# Patient Record
Sex: MALE | Race: WHITE
[De-identification: names, ages, dates, MRNs, and addresses within clinical notes are randomized per-mention and may not be internally consistent; named-entity substitution may affect disease eponyms.]

---

## 2022-12-02 ENCOUNTER — HOSPITAL ENCOUNTER (OUTPATIENT)
Dept: HOSPITAL 47 - LABPAT | Age: 57
Discharge: HOME | End: 2022-12-02
Attending: ORTHOPAEDIC SURGERY
Payer: COMMERCIAL

## 2022-12-02 DIAGNOSIS — Z01.812: Primary | ICD-10-CM

## 2022-12-02 LAB
ALBUMIN SERPL-MCNC: 4.9 G/DL (ref 3.8–4.9)
ALBUMIN/GLOB SERPL: 1.91 G/DL (ref 1.6–3.17)
ALP SERPL-CCNC: 89 U/L (ref 41–126)
ALT SERPL-CCNC: 39 U/L (ref 10–49)
ANION GAP SERPL CALC-SCNC: 12.7 MMOL/L (ref 10–18)
APTT BLD: 26 SEC (ref 22–30)
AST SERPL-CCNC: 22 U/L (ref 14–35)
BASOPHILS # BLD AUTO: 0.05 X 10*3/UL (ref 0–0.1)
BASOPHILS NFR BLD AUTO: 0.6 %
BUN SERPL-SCNC: 15.8 MG/DL (ref 9–27)
BUN/CREAT SERPL: 14.77 RATIO (ref 12–20)
CALCIUM SPEC-MCNC: 10 MG/DL (ref 8.7–10.3)
CHLORIDE SERPL-SCNC: 101 MMOL/L (ref 96–109)
CO2 SERPL-SCNC: 26.4 MMOL/L (ref 20–27.5)
EOSINOPHIL # BLD AUTO: 0.54 X 10*3/UL (ref 0.04–0.35)
EOSINOPHIL NFR BLD AUTO: 6.6 %
ERYTHROCYTE [DISTWIDTH] IN BLOOD BY AUTOMATED COUNT: 5.59 X 10*6/UL (ref 4.4–5.6)
ERYTHROCYTE [DISTWIDTH] IN BLOOD: 13.3 % (ref 11.5–14.5)
GLOBULIN SER CALC-MCNC: 2.6 G/DL (ref 1.6–3.3)
GLUCOSE SERPL-MCNC: 105 MG/DL (ref 70–110)
HCT VFR BLD AUTO: 52.3 % (ref 39.6–50)
HGB BLD-MCNC: 17.3 G/DL (ref 13–17)
IMM GRANULOCYTES BLD QL AUTO: 0.2 %
INR PPP: 1.1 (ref ?–1.2)
LYMPHOCYTES # SPEC AUTO: 1.42 X 10*3/UL (ref 0.9–5)
LYMPHOCYTES NFR SPEC AUTO: 17.4 %
MCH RBC QN AUTO: 30.9 PG (ref 27–32)
MCHC RBC AUTO-ENTMCNC: 33.1 G/DL (ref 32–37)
MCV RBC AUTO: 93.6 FL (ref 80–97)
MONOCYTES # BLD AUTO: 0.58 X 10*3/UL (ref 0.2–1)
MONOCYTES NFR BLD AUTO: 7.1 %
NEUTROPHILS # BLD AUTO: 5.54 X 10*3/UL (ref 1.8–7.7)
NEUTROPHILS NFR BLD AUTO: 68.1 %
NRBC BLD AUTO-RTO: 0 /100 WBCS (ref 0–0)
PH UR: 5.5 [PH] (ref 5–8)
PLATELET # BLD AUTO: 225 X 10*3/UL (ref 140–440)
POTASSIUM SERPL-SCNC: 4.5 MMOL/L (ref 3.5–5.5)
PROT SERPL-MCNC: 7.5 G/DL (ref 6.2–8.2)
PROT UR QL: (no result)
PT BLD: 11.7 SEC (ref 9–12)
RBC UR QL: 2 /HPF (ref 0–5)
SODIUM SERPL-SCNC: 140 MMOL/L (ref 135–145)
SP GR UR: 1.03 (ref 1–1.03)
UROBILINOGEN UR QL STRIP: <2 MG/DL (ref ?–2)
WBC # BLD AUTO: 8.15 X 10*3/UL (ref 4.5–10)
WBC #/AREA URNS HPF: 2 /HPF (ref 0–5)

## 2022-12-02 PROCEDURE — 85025 COMPLETE CBC W/AUTO DIFF WBC: CPT

## 2022-12-02 PROCEDURE — 80053 COMPREHEN METABOLIC PANEL: CPT

## 2022-12-02 PROCEDURE — 85610 PROTHROMBIN TIME: CPT

## 2022-12-02 PROCEDURE — 87070 CULTURE OTHR SPECIMN AEROBIC: CPT

## 2022-12-02 PROCEDURE — 81001 URINALYSIS AUTO W/SCOPE: CPT

## 2022-12-02 PROCEDURE — 85730 THROMBOPLASTIN TIME PARTIAL: CPT

## 2022-12-12 ENCOUNTER — HOSPITAL ENCOUNTER (OUTPATIENT)
Dept: HOSPITAL 47 - OR | Age: 57
Discharge: HOME HEALTH | End: 2022-12-12
Attending: ORTHOPAEDIC SURGERY
Payer: COMMERCIAL

## 2022-12-12 VITALS — TEMPERATURE: 97.2 F

## 2022-12-12 VITALS — HEART RATE: 79 BPM

## 2022-12-12 VITALS — SYSTOLIC BLOOD PRESSURE: 108 MMHG | RESPIRATION RATE: 18 BRPM | DIASTOLIC BLOOD PRESSURE: 62 MMHG

## 2022-12-12 VITALS — BODY MASS INDEX: 34.9 KG/M2

## 2022-12-12 DIAGNOSIS — Z79.899: ICD-10-CM

## 2022-12-12 DIAGNOSIS — I10: ICD-10-CM

## 2022-12-12 DIAGNOSIS — Z79.84: ICD-10-CM

## 2022-12-12 DIAGNOSIS — Z87.891: ICD-10-CM

## 2022-12-12 DIAGNOSIS — M25.752: ICD-10-CM

## 2022-12-12 DIAGNOSIS — M16.12: Primary | ICD-10-CM

## 2022-12-12 DIAGNOSIS — Z86.59: ICD-10-CM

## 2022-12-12 DIAGNOSIS — Z82.49: ICD-10-CM

## 2022-12-12 DIAGNOSIS — Z79.83: ICD-10-CM

## 2022-12-12 DIAGNOSIS — Z79.02: ICD-10-CM

## 2022-12-12 DIAGNOSIS — E78.5: ICD-10-CM

## 2022-12-12 PROCEDURE — 86850 RBC ANTIBODY SCREEN: CPT

## 2022-12-12 PROCEDURE — 86901 BLOOD TYPING SEROLOGIC RH(D): CPT

## 2022-12-12 PROCEDURE — 88300 SURGICAL PATH GROSS: CPT

## 2022-12-12 PROCEDURE — 97530 THERAPEUTIC ACTIVITIES: CPT

## 2022-12-12 PROCEDURE — 73501 X-RAY EXAM HIP UNI 1 VIEW: CPT

## 2022-12-12 PROCEDURE — 27130 TOTAL HIP ARTHROPLASTY: CPT

## 2022-12-12 PROCEDURE — 97161 PT EVAL LOW COMPLEX 20 MIN: CPT

## 2022-12-12 PROCEDURE — 86900 BLOOD TYPING SEROLOGIC ABO: CPT

## 2022-12-12 RX ADMIN — HYDROMORPHONE HYDROCHLORIDE PRN MG: 1 INJECTION, SOLUTION INTRAMUSCULAR; INTRAVENOUS; SUBCUTANEOUS at 09:40

## 2022-12-12 RX ADMIN — HYDROMORPHONE HYDROCHLORIDE PRN MG: 1 INJECTION, SOLUTION INTRAMUSCULAR; INTRAVENOUS; SUBCUTANEOUS at 09:25

## 2022-12-12 NOTE — FL
Intraoperative/procedural fluoroscopic services were provided. Total fluoroscopy time is 1 minute 7 s
econds with a total of 3 submitted images to PACS. Please see the operative/procedural note for furth
er details.

## 2022-12-12 NOTE — XR
EXAMINATION TYPE: XR Hip Limited LT

 

DATE OF EXAM: 12/12/2022

 

COMPARISON: NONE

 

HISTORY: Postop

 

TECHNIQUE: One view submitted.

 

FINDINGS:

There is postsurgical change in near anatomic alignment.  There is soft tissue edema and emphysema. 

 

IMPRESSION:

1. Postoperative change.  Appears in near-anatomic alignment.

## 2022-12-12 NOTE — P.OP
Date of Procedure: 12/12/22


Preoperative Diagnosis: 


Severe osteoarthritis left hip


Postoperative Diagnosis: 


Severe osteoarthritis left hip


Procedure(s) Performed: 


Left total hip arthroplasty with a direct anterior approach


Implants: 


[employer] Polarstem standard size 7


[employer] R3, 3 hole hemispherical acetabular shell,56 mm


[employer] Reflection 6.5 mm cancellus screw, 25 mm 2


[employer] R3, XLPE 20 acetabular liner 


[employer]hew Oxinium femoral head 36 m, +8


All components were press-fit.


The articulation is Oxinium on polyethylene.


Anesthesia: GETA


Surgeon: Deonte Vitale


Assistant #1: Elayne Rod


Estimated Blood Loss (ml): 500


Pathology: other (Femoral head)


Condition: stable


Disposition: PACU


Indications for Procedure: 


After failure of conservative treatment we discussed the surgical and 

nonsurgical treatment options at length.  Patient wishes to proceed with a total

hip arthroplasty with a direct anterior approach.  Complications specific to 

this procedure were discussed at length, including but not limited to infection,

leg length discrepancy, dislocation, nerve injury, and fracture.  Covid-19 was 

also discussed at length with the patient, and they are aware of the current 

policies and procedures.  The patient was given the option of delaying surgery, 

but they elect to proceed knowing these risks.  Patient is aware of all these 

complications and informed consent was obtained


Operative Findings: 


The operative findings are consistent with severe osteoarthritis the left hip


Description of Procedure: 


Patient was seen and evaluated in the preoperative area and the consent was 

reviewed.  The operative site was marked with a skin marker.  The patient was 

then brought to the operating room and given preoperative antibiotics intraven

ously.  1 g of Tranexamic acid was also given intravenously.  A general 

anesthetic was administered by the anesthesia department.   The patient was then

placed on the Quentin table with the bony prominences well-padded.  The hip area 

was then prepped with a ChloraPrep solution and draped in the usual sterile 

fashion.





A universal timeout was then performed, which confirmed the patient's name, 

surgical site, ALLERGIES, and procedure being performed on the consent.  Next 

the incision site was located at 1 cm distal and 2 cm lateral to the anterior 

superior iliac spine.  The skin and subcutaneous tissues were sharply incised.  

Incision was carefully dissected down to the fascia overlying the tensor fascia 

leah muscle.  This fascia was then incised in line with the incision.  Care was 

taken to stay laterally in order to avoid injuring the lateral femoral cutaneous

nerve.  Next, using blunt finger dissection, the tensor fascia leah muscle was 

dissected off its investing fascia.  The muscle was then carefully retracted 

laterally with a cobra retractor over the lateral neck of the femur.  Next, the 

circumflex vessels were identified and cauterized using the AquaMantis device.  

The anterior hip capsule was then exposed.  The capsule was then opened and an 

inverted T fashion.  Cobra retractors were then placed intracapsularly.  The 

retractors were maintained intracapsular throughout the procedure.  The proximal

femur was then visualized.  Fluoroscopic x-rays were then taken in order to 

evaluate the preoperative leg lengths.  A small amount of traction was placed on

the leg.





The femoral neck was then osteotomized at the appropriate level above the lesser

trochanter.  A small wedge of bone was then removed from the remaining femoral 

head.  Next, using a corkscrew the femoral head was removed from the acetabulum.

 On gross visual inspection, the femoral head had complete loss of articular 

cartilage and multiple periarticular osteophytes.  The femoral head was then 

measured.  Attention was then turned to the acetabulum.





The acetabulum was exposed and any remaining labrum was excised.  Sequential 

reaming of the acetabulum was performed using fluoroscopic guidance until there 

was a good bed of bleeding cancellus bone.  When the appropriate size was 

reached, a trial was then placed.  The position and fit of the trial was checked

with fluoroscopy.  The trial was then removed.  Then, using fluoroscopic 

guidance, the final implant was impacted at 20 of anteversion and 40 of 

abduction, and fully seated in the acetabulum.  2 screws were then placed in the

acetabulum.  Again fluoroscopy was used to check position of the screws.  Next, 

the liner was then impacted, with a 20 elevated liner located in the anterior 

superior quadrant.  Component locking was confirmed.  Next, a curved osteotome 

was used to remove multiple large osteophytes around the rim of the acetabulum.





Attention was then directed to the femur.  With the aid of the Quentin table, the 

femur was externally rotated to approximately 130, extended, and adducted under

the opposite leg.  A side hook was then placed under the proximal femur, and the

side hook elevator was used to elevate the proximal femur while releasing the 

capsule.  Retractors were then placed.  A capsular release was performed, as 

well as a release of the conjoined tendon, which afforded excellent 

visualization of the proximal femur.  Next, a box osteotome was used to 

lateralize the proximal femur.  A  was then used to locate the 

femoral canal.  Sequential broaching was then performed with appropriate size 

which afforded excellent fixation in the proximal femur.  A trial was then 

placed with appropriate head and neck, and the hip was gently reduced with the 

aid of the Quentin table.  Fluoroscopy was then used to check position of the 

components, as well as to evaluate the leg lengths and offset.  The leg lengths 

and offset were measured as closely as possible to ensure stability of the hip. 

The hip was then gently dislocated and the trials were then removed.  Final 

implants were then impacted and the hip was again reduced.  Final fluoroscopic 

x-rays confirmed that the components were in anatomic position.  The leg lengths

and offset were measured and were found to coincide with the trial measurements.

 The hip was also taken through range of motion, and found to be stable.





The hip was then copiously irrigated with antibiotic solution with pulsatile 

lavage.  The hip was then irrigated with Irrisept solution.  The soft tissues 

were then injected with a ropivacaine solution.  A second dose of 1 g of Tr

anexamic acid was also given intravenously. 





The fascia was then closed with 2-0 strata fix suture.  The subcutaneous tissue 

was closed with 3-0 Vicryl.  The subcuticular tissue was closed with 3-0 strata 

fix suture.  The skin was then closed with Exofin skin glue.  After the glue and

dried, and Optifoam silver impregnated dressing was applied.  The patient was 

then transferred to the recovery room in stable condition.





The assistant  COLBY Kat was required due to the complexity of surgery, 

and the need for skilled surgical assistant for positioning, draping, exposure, 

retraction, and closure of the wound.